# Patient Record
(demographics unavailable — no encounter records)

---

## 2025-04-10 NOTE — PHYSICAL EXAM
[de-identified] : WNL [de-identified] : WNL [de-identified] : ISMAELL [de-identified] : WNL [de-identified] : WNL

## 2025-04-10 NOTE — HISTORY OF PRESENT ILLNESS
[de-identified] : Today patient reports a week of pain, itchiness, few bumps in three areas by his scalp - nape, this morning it was pressed and had yellow drainage output.  No fever or chills.  Patient is on baby aspirin.

## 2025-04-10 NOTE — ASSESSMENT
[FreeTextEntry1] : Ximena is an 82-year-old male who had a trip to the woods about a week ago. Since then, he developed itching of the scalp. He saw a dermatologist today who advised him to go to the ED to rule out infection/abscess. on exam there is four centimeter area in the posterior lower scalp at the level of the ears in the center of the scalp with inflamed thickened skin there is no obvious boil or fluctuations.  Assessment patient with itching as his only symptom a week after a trip in the woods  has dermatitis of the scalp. I do not appreciate any collections.  I discussed his condition with his doctor, who's a physician. I'm not sure of the etiology of his problem, It does not appear to be an abscess that needs lancing. I advised for a dermatology consultation with the [facility] Department of Dermatology, Dr. Dixon, and colleagues.  I prescribed Keflex in the meantime. My office will also obtain an ultrasound of the area to further evaluate for collection. I also prescribed clindamycin gel as per son's request.  RTO as needed. I will call with the ultrasound results.